# Patient Record
Sex: MALE | Race: WHITE | NOT HISPANIC OR LATINO | Employment: FULL TIME | URBAN - METROPOLITAN AREA
[De-identification: names, ages, dates, MRNs, and addresses within clinical notes are randomized per-mention and may not be internally consistent; named-entity substitution may affect disease eponyms.]

---

## 2021-03-31 ENCOUNTER — APPOINTMENT (OUTPATIENT)
Dept: RADIOLOGY | Facility: MEDICAL CENTER | Age: 56
End: 2021-03-31
Attending: EMERGENCY MEDICINE
Payer: COMMERCIAL

## 2021-03-31 ENCOUNTER — HOSPITAL ENCOUNTER (EMERGENCY)
Facility: MEDICAL CENTER | Age: 56
End: 2021-03-31
Attending: EMERGENCY MEDICINE
Payer: COMMERCIAL

## 2021-03-31 VITALS
HEART RATE: 82 BPM | BODY MASS INDEX: 22.48 KG/M2 | HEIGHT: 72 IN | SYSTOLIC BLOOD PRESSURE: 143 MMHG | RESPIRATION RATE: 17 BRPM | DIASTOLIC BLOOD PRESSURE: 89 MMHG | TEMPERATURE: 98.5 F | WEIGHT: 166.01 LBS | OXYGEN SATURATION: 95 %

## 2021-03-31 DIAGNOSIS — S61.411A LACERATION OF RIGHT HAND, FOREIGN BODY PRESENCE UNSPECIFIED, INITIAL ENCOUNTER: ICD-10-CM

## 2021-03-31 PROCEDURE — 700111 HCHG RX REV CODE 636 W/ 250 OVERRIDE (IP): Performed by: EMERGENCY MEDICINE

## 2021-03-31 PROCEDURE — 700101 HCHG RX REV CODE 250: Performed by: EMERGENCY MEDICINE

## 2021-03-31 PROCEDURE — 90715 TDAP VACCINE 7 YRS/> IM: CPT | Performed by: EMERGENCY MEDICINE

## 2021-03-31 PROCEDURE — 303747 HCHG EXTRA SUTURE

## 2021-03-31 PROCEDURE — 700102 HCHG RX REV CODE 250 W/ 637 OVERRIDE(OP): Performed by: EMERGENCY MEDICINE

## 2021-03-31 PROCEDURE — 99284 EMERGENCY DEPT VISIT MOD MDM: CPT

## 2021-03-31 PROCEDURE — A9270 NON-COVERED ITEM OR SERVICE: HCPCS | Performed by: EMERGENCY MEDICINE

## 2021-03-31 PROCEDURE — 304217 HCHG IRRIGATION SYSTEM

## 2021-03-31 PROCEDURE — 90471 IMMUNIZATION ADMIN: CPT

## 2021-03-31 PROCEDURE — 73130 X-RAY EXAM OF HAND: CPT | Mod: RT

## 2021-03-31 PROCEDURE — 304999 HCHG REPAIR-SIMPLE/INTERMED LEVEL 1

## 2021-03-31 RX ORDER — LIDOCAINE HYDROCHLORIDE 10 MG/ML
20 INJECTION, SOLUTION INFILTRATION; PERINEURAL ONCE
Status: COMPLETED | OUTPATIENT
Start: 2021-03-31 | End: 2021-03-31

## 2021-03-31 RX ORDER — CEPHALEXIN 500 MG/1
500 CAPSULE ORAL ONCE
Status: COMPLETED | OUTPATIENT
Start: 2021-03-31 | End: 2021-03-31

## 2021-03-31 RX ORDER — CEPHALEXIN 500 MG/1
500 CAPSULE ORAL 4 TIMES DAILY
Qty: 28 CAPSULE | Refills: 0 | Status: SHIPPED | OUTPATIENT
Start: 2021-03-31 | End: 2021-04-05

## 2021-03-31 RX ADMIN — CLOSTRIDIUM TETANI TOXOID ANTIGEN (FORMALDEHYDE INACTIVATED), CORYNEBACTERIUM DIPHTHERIAE TOXOID ANTIGEN (FORMALDEHYDE INACTIVATED), BORDETELLA PERTUSSIS TOXOID ANTIGEN (GLUTARALDEHYDE INACTIVATED), BORDETELLA PERTUSSIS FILAMENTOUS HEMAGGLUTININ ANTIGEN (FORMALDEHYDE INACTIVATED), BORDETELLA PERTUSSIS PERTACTIN ANTIGEN, AND BORDETELLA PERTUSSIS FIMBRIAE 2/3 ANTIGEN 0.5 ML: 5; 2; 2.5; 5; 3; 5 INJECTION, SUSPENSION INTRAMUSCULAR at 09:01

## 2021-03-31 RX ADMIN — LIDOCAINE HYDROCHLORIDE 20 ML: 10 INJECTION, SOLUTION INFILTRATION; PERINEURAL at 07:49

## 2021-03-31 RX ADMIN — CEPHALEXIN 500 MG: 500 CAPSULE ORAL at 08:56

## 2021-03-31 NOTE — ED PROVIDER NOTES
ED Provider Note    CHIEF COMPLAINT  Chief Complaint   Patient presents with   • Hand Laceration     2 hours ago accidentally cut top of R hand with a 'cut off wheel' while working on a car. Pt wrapped wound PTA, bleeding controlled with bandage, pt denies signs of arterial bleed. States some numbness to R thumb, able to move all digits.        HPI  Tono Jeffrey is a 55 y.o. male who presents with a laceration accidentally cut his right hand with a cut off we will.  Bleeding controlled.  Injury occurred just before coming in.  Mild pain since.  Constant nonradiating.  Denies numbness tingling weakness.  Reports normal motor function.    REVIEW OF SYSTEMS  As per HPI, otherwise a 10 point review of systems is negative    PAST MEDICAL HISTORY  Past Medical History:   Diagnosis Date   • Vertigo        SOCIAL HISTORY  Social History     Tobacco Use   • Smoking status: Never Smoker   Substance Use Topics   • Alcohol use: Not on file   • Drug use: Not on file       SURGICAL HISTORY  History reviewed. No pertinent surgical history.    CURRENT MEDICATIONS  Home Medications     Reviewed by Iker Monet R.N. (Registered Nurse) on 03/31/21 at 0717  Med List Status: Partial   Medication Last Dose Status   meclizine (ANTIVERT) 25 MG TABS  Active                ALLERGIES  No Known Allergies    PHYSICAL EXAM  VITAL SIGNS: /99   Pulse 87   Temp 36.1 °C (97 °F) (Temporal)   Resp 17   Ht 1.829 m (6')   Wt 75.3 kg (166 lb 0.1 oz)   SpO2 97%   BMI 22.51 kg/m²    Constitutional: Awake and alert  HENT: Normal inspection  Eyes: Normal inspection  Neck: Grossly normal range of motion.  Cardiovascular: Normal heart rate  Thorax & Lungs: No respiratory distress  Skin: laceration extending across the dorsal right first MCP region.  On wound exploration this appears to extend into the first MCP space.  Extremities: Laceration as above.  Apparent intact extensor mechanism of the thumb.  Normal abduction  abduction.  Neurologic:  normal sensory  Psychiatric: Normal for situation    RADIOLOGY/PROCEDURES  DX-HAND 3+ RIGHT   Final Result         No acute fracture or dislocation.      Few tiny debris seen only on lateral view, may be on the ventral surface.           Imaging is interpreted by radiologist    Laceration Repair Procedure Note    Indication: Laceration    Procedure: The patient was placed in the appropriate position and anesthesia around the laceration was obtained by infiltration using 1% Lidocaine without epinephrine.  Wound was explored noting suspected right open first MCP joint.  There were no foreign bodies.  The area was then irrigated extensively with normal saline. The laceration was closed with 4-0 Ethilon using running sutures. There were no additional lacerations requiring repair. The wound area was then dressed with a sterile dressing.      Total repaired wound length: 5 cm.     Other Items: None          Medications   cephALEXin (KEFLEX) capsule 500 mg (has no administration in time range)   lidocaine (XYLOCAINE) 1%  injection (20 mL Other Given 3/31/21 4416)       COURSE & MEDICAL DECISION MAKING  Patient presents with a wound over his right thumb.  O motor and sensory appears to be intact.  On exploration of the wound I am worried that the wound violated the first MCP joint space.  Consulted with Dr. Orlando.  Advised skin closure and follow-up in the office.  Patient's wound was repaired.  Obtained x-ray.  Patient given Keflex.  Will update tetanus.  Placed in thumb spica splint.  Patient given instructions on wound care.  Will return for any signs of infection.      FINAL IMPRESSION  1.  Right hand laceration  2.  Suspected open right first MCP joint      This dictation was created using voice recognition software. The accuracy of the dictation is limited to the abilities of the software.  The nursing notes were reviewed and certain aspects of this information were incorporated into this  note.      Electronically signed by: Raad Alvarado M.D., 3/31/2021 8:43 AM

## 2021-03-31 NOTE — ED NOTES
Patient ambulatory to [facility], bandage in place to right hand, patient states he is up to date on tetanus, chart up for ERP, call light within reach.

## 2021-03-31 NOTE — ED TRIAGE NOTES
Chief Complaint   Patient presents with   • Hand Laceration     2 hours ago accidentally cut top of R hand with a 'cut off wheel' while working on a car. Pt wrapped wound PTA, bleeding controlled with bandage, pt denies signs of arterial bleed. States some numbness to R thumb, able to move all digits.      Pt ambulatory to triage for above complaint, VSS on RA, GCS 15, NAD.    Denies all s/sx of covid, denies recent travel, denies fevers.    Pt returned to [facility]. Educated on triage process and to inform staff of any changes.     /99   Pulse 87   Temp 36.1 °C (97 °F) (Temporal)   Resp 17   Ht 1.829 m (6')   Wt 75.3 kg (166 lb 0.1 oz)   SpO2 97%   BMI 22.51 kg/m²

## 2021-03-31 NOTE — ED NOTES
Patient given discharge instructions and follow up information, verbalized understanding, prescription x 1 electronically sent to pharmacy, location verified, patient ambulatory out of ED w/steady gait, velcro wrist splint in place.

## 2021-03-31 NOTE — LETTER
"  FORM C-4:  EMPLOYEE’S CLAIM FOR COMPENSATION/ REPORT OF INITIAL TREATMENT  EMPLOYEE’S CLAIM - PROVIDE ALL INFORMATION REQUESTED   First Name   Tono Last Name   Wojciech Birthdate   1965  Sex   male Claim Number   Home Address [address]   [facility]             [place] [address]                                   Age  55 y.o. 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 (if applicable)   CUTTING W/  CUT OFF WHEEL    How did this injury or occupational disease occur? Be specific and answer in detail. Use additional sheet if necessary)  USING 4\" CUT OFF WHEEL TO CUT OFF FRAME MOUNT AND IT SLIPPED AND   CUT ACROSS TO BACK OF MY RIGHT HAND.   If you believe that you have an occupational disease, when did you first have knowledge of the disability and it relationship to your employment? N/A Witnesses to the Accident  N/A   Nature of Injury or Occupational Disease  Laceration Part(s) of Body Injured or Affected  Thumb (R), N/A, N/A    I CERTIFY THAT THE ABOVE IS TRUE AND CORRECT TO THE BEST OF MY KNOWLEDGE AND THAT I HAVE PROVIDED THIS INFORMATION IN ORDER TO OBTAIN THE BENEFITS " OF [place]).  I HEREBY AUTHORIZE ANY PHYSICIAN, CHIROPRACTOR, SURGEON, PRACTITIONER, OR OTHER PERSON, ANY HOSPITAL, INCLUDING [facility] OR [facility], ANY MEDICAL SERVICE ORGANIZATION, ANY INSURANCE COMPANY, OR OTHER INSTITUTION OR ORGANIZATION TO RELEASE TO EACH OTHER, ANY MEDICAL OR OTHER INFORMATION, INCLUDING BENEFITS PAID OR PAYABLE, PERTINENT TO THIS INJURY OR DISEASE, EXCEPT INFORMATION RELATIVE TO DIAGNOSIS, TREATMENT AND/OR COUNSELING FOR AIDS, PSYCHOLOGICAL CONDITIONS, ALCOHOL OR CONTROLLED SUBSTANCES, FOR WHICH I MUST GIVE SPECIFIC AUTHORIZATION.  A PHOTOSTAT OF THIS AUTHORIZATION SHALL BE AS VALID AS THE ORIGINAL.  Date 03/31/2021                                     Place  [facility]                                                                            Employee’s Signature   THIS REPORT MUST BE COMPLETED AND MAILED WITHIN 3 WORKING DAYS OF TREATMENT   Place [facility], EMERGENCY DEPT                       Name of Facility [facility]   Date  3/31/2021 Diagnosis  (S61.411A) Laceration of right hand, foreign body presence unspecified, initial encounter Is there evidence the injured employee was under the influence of alcohol and/or another controlled substance at the time of accident?   Hour  9:39 AM Description of Injury or Disease  Laceration of right hand, foreign body presence unspecified, initial encounter No   Treatment  Wound repair, splinting, antibiotics, orthopedic follow-up  Have you advised the patient to remain off work five days or more?         No   X-Ray Findings  Negative If Yes   From Date    To Date      From information given by the employee, together with medical evidence, can you directly connect this injury or occupational disease as job incurred?  Yes If No, is employee capable of: Full Duty  No Modified Duty  Yes   Is  "additional medical care by a physician indicated?   Yes If Modified Duty, Specify any Limitations / Restrictions   No use of right hand no use of right hand until cleared by orthopedics   Do you know of any previous injury or disease contributing to this condition or occupational disease?   No    Date   3/31/2021 Print Doctor’s Name   Brigitte Padilla certify the employer’s copy of this form was mailed on:  03/31/2021   Address [address] [place] [id number] [phone] INSURER’S USE ONLY   Provider’s Tax ID Number    [id number] Telephone Dept:   [phone]    Doctor’s Signature   e-SignBRIGITTE PADILLA M.D. Degree   MD      Form C-4 (rev.10/07)                                                                         BRIEF DESCRIPTION OF RIGHTS AND BENEFITS  (Pursuant to [place] 616C.050)    Notice of Injury or Occupational Disease (Incident Report Form C-1): If an injury or occupational disease (OD) arises out of and in the course of employment, you must provide written notice to your employer as soon as practicable, but no later than 7 days after the accident or OD. Your employer shall maintain a sufficient supply of the required forms.    Claim for Compensation (Form C-4): If medical treatment is sought, the form C-4 is available at the place of initial treatment. A completed \"Claim for Compensation\" (Form C-4) must be filed within 90 days after an accident or OD. The treating physician or chiropractor must, within 3 working days after treatment, complete and mail to the employer, the employer's insurer and third-party , the Claim for Compensation.    Medical Treatment: If you require medical treatment for your on-the-job injury or OD, you may be required to select a physician or chiropractor from a list provided by your workers’ compensation insurer, if it has contracted with an Organization for Managed Care (MCO) or Preferred Provider Organization (PPO) or providers of health care. If your " employer has not entered into a contract with an MCO or PPO, you may select a physician or chiropractor from the Panel of Physicians and Chiropractors. Any medical costs related to your industrial injury or OD will be paid by your insurer.    Temporary Total Disability (TTD): If your doctor has certified that you are unable to work for a period of at least 5 consecutive days, or 5 cumulative days in a 20-day period, or places restrictions on you that your employer does not accommodate, you may be entitled to TTD compensation.    Temporary Partial Disability (TPD): If the wage you receive upon reemployment is less than the compensation for TTD to which you are entitled, the insurer may be required to pay you TPD compensation to make up the difference. TPD can only be paid for a maximum of 24 months.    Permanent Partial Disability (PPD): When your medical condition is stable and there is an indication of a PPD as a result of your injury or OD, within 30 days, your insurer must arrange for an evaluation by a rating physician or chiropractor to determine the degree of your PPD. The amount of your PPD award depends on the date of injury, the results of the PPD evaluation, your age and wage.    Permanent Total Disability (PTD): If you are medically certified by a treating physician or chiropractor as permanently and totally disabled and have been granted a PTD status by your insurer, you are entitled to receive monthly benefits not to exceed 66 2/3% of your average monthly wage. The amount of your PTD payments is subject to reduction if you previously received a lump-sum PPD award.    Vocational Rehabilitation Services: You may be eligible for vocational rehabilitation services if you are unable to return to the job due to a permanent physical impairment or permanent restrictions as a result of your injury or occupational disease.    Transportation and Per Rose Mary Reimbursement: You may be eligible for travel expenses and  per jocelyn associated with medical treatment.    Reopening: You may be able to reopen your claim if your condition worsens after claim closure.     Appeal Process: If you disagree with a written determination issued by the insurer or the insurer does not respond to your request, you may appeal to the Department of Administration, , by following the instructions contained in your determination letter. You must appeal the determination within 70 days from the date of the determination letter at [address], [address], [place], [place] [address], or [address], [address], [place], [place] [address]. If you disagree with the  decision, you may appeal to the Department of Administration, . You must file your appeal within 30 days from the date of the  decision letter at [address], [address], [place], [place] [address], or [address], [place], [place] [address]. If you disagree with a decision of an , you may file a petition for judicial review with the District Court. You must do so within 30 days of the Appeal Officer’s decision. You may be represented by an  at your own expense or you may contact the [facility] for possible representation.    [place]  for Injured Workers (NAIW): If you disagree with a  decision, you may request that NAIW represent you without charge at an  Hearing. For information regarding denial of benefits, you may contact the [facility] at: [address], [address], [place] [address], [phone], or [address], [place] [address], [phone]    To File a Complaint with the Division: If you wish to file a complaint with the  of the Division of Industrial Relations (DIR),  please contact the Workers’ Compensation Section, [address], [address], [place], [place] [address], telephone  [phone], or [address], [address], [place], [place] [address], telephone [phone].    For assistance with Workers’ Compensation Issues: You may contact the [facility] Office for Consumer Health Assistance, [address], [address], [place] [address], Toll Free 1-230.348.5425, Web site: http://[facility].[place].gov/Programs/SONI E-mail: soni@[facility].nv.gov  D-2 (rev. 10/20)              __________________________________________________________________                                    _________________            Employee Name / Signature                                                                                                                            Date

## 2021-04-10 ENCOUNTER — HOSPITAL ENCOUNTER (EMERGENCY)
Facility: MEDICAL CENTER | Age: 56
End: 2021-04-10

## 2021-04-10 VITALS
HEART RATE: 81 BPM | HEIGHT: 72 IN | OXYGEN SATURATION: 95 % | TEMPERATURE: 97.6 F | BODY MASS INDEX: 22.34 KG/M2 | WEIGHT: 164.9 LBS | DIASTOLIC BLOOD PRESSURE: 81 MMHG | SYSTOLIC BLOOD PRESSURE: 117 MMHG | RESPIRATION RATE: 16 BRPM

## 2021-04-10 DIAGNOSIS — Z48.02 VISIT FOR SUTURE REMOVAL: ICD-10-CM

## 2021-04-10 PROCEDURE — 99281 EMR DPT VST MAYX REQ PHY/QHP: CPT

## 2021-04-10 PROCEDURE — 99281 EMR DPT VST MAYX REQ PHY/QHP: CPT | Mod: EDC

## 2021-04-10 NOTE — ED TRIAGE NOTES
Amb to triage, here for suture removal.  Pt had a 5 cm laceration to R thumb, closed w/ 4-0 Ethilon running sutures on 3/31.  Discharge instructions to follow up w/ Dr. Orlando.  Pt has been unable to get in yet.  Dr. Augustine to triage rm to evaluate pts healing wound.  ERP okayed the removal of pts sutures at triage.  Running sutures removed w/o issue.  No s/s of infection.  Pt has a follow up appt scheduled in 1 wk.

## 2021-04-10 NOTE — ED PROVIDER NOTES
ED Provider Note    CHIEF COMPLAINT  Chief Complaint   Patient presents with   • Suture / Staple Removal     Seen at 10:43 AM    HPI  Tono Jeffrey is a 55 y.o. male who presents for suture removal.  He had a deep laceration to the right dorsal aspect of the proximal thumb overlying the MCP joint.  The patient was evaluated by Dr. Alvarado, he had a laceration was closed.  He was placed on antibiotics and recommended to follow-up with the [facility].  The patient does not have a follow-up appointment until 48 hours from now which would put him past his normal suture removal time.  He presents here simply for suture removal.  He did have some reactive erythema that he states has gradually been improving.  He has some stiffness noted about the thumb but this appears to be improving as well.  He notes complete loss of sensation distal to the laceration.    REVIEW OF SYSTEMS  See HPI  PAST MEDICAL HISTORY   has a past medical history of Vertigo.    SOCIAL HISTORY  Social History     Tobacco Use   • Smoking status: Never Smoker   Substance and Sexual Activity   • Alcohol use: Not on file   • Drug use: Not on file   • Sexual activity: Not on file       SURGICAL HISTORY  patient denies any surgical history    CURRENT MEDICATIONS  Reviewed.  See Encounter Summary.     ALLERGIES  No Known Allergies    PHYSICAL EXAM  VITAL SIGNS: /81   Pulse 81   Temp 36.4 °C (97.6 °F) (Temporal)   Resp 16   Ht 1.829 m (6')   Wt 74.8 kg (164 lb 14.5 oz)   SpO2 95%   BMI 22.37 kg/m²   Constitutional: Awake, alert in no apparent distress.  HENT: Normocephalic, Bilateral external ears normal. Nose normal.   Eyes: Conjunctiva normal, non-icteric, EOMI.    Thorax & Lungs: Easy unlabored respirations  Cardiovascular:    Abdomen:  No distention  Skin: Visualized skin is  Dry, No erythema, No rash.   Extremities: Right thumb, good flexion, extension and circumduction, brisk capillary refill.  There is a well-healed  laceration over the MCP joint with some mild reactive erythema.  No purulence expressed.  No dehiscence.  Neurologic: Alert, Grossly non-focal.   Psychiatric: Affect and Mood normal    RADIOLOGY  No orders to display       Nursing notes and vital signs were reviewed. (See chart for details)    Decision Making:  This is a pleasant 55 y.o. year old male who presents for suture removal.  The wound is healing well, sutures removed per nursing.  I did briefly examine the patient, no sign of a septic joint, no obvious signs of an extensor tendon injury.  I do agree that it is reasonable to follow-up with orthopedics given that he has some persistent stiffness, loss of sensation distally.    DISPOSITION:  Patient will be discharged home in good condition.    Discharge Medications:  New Prescriptions    No medications on file       The patient was discharged home (see d/c instructions) and told to return immediately for any signs or symptoms listed, or any worsening at all.  The patient verbally agreed to the discharge precautions and follow-up plan which is documented in EPIC.        FINAL IMPRESSION  1. Visit for suture removal